# Patient Record
Sex: FEMALE | Race: BLACK OR AFRICAN AMERICAN | NOT HISPANIC OR LATINO | Employment: UNEMPLOYED | URBAN - METROPOLITAN AREA
[De-identification: names, ages, dates, MRNs, and addresses within clinical notes are randomized per-mention and may not be internally consistent; named-entity substitution may affect disease eponyms.]

---

## 2019-12-27 PROBLEM — Z59.6 POVERTY: Status: ACTIVE | Noted: 2019-12-27

## 2019-12-27 PROBLEM — I50.9 CHRONIC HEART FAILURE: Status: ACTIVE | Noted: 2019-12-27

## 2019-12-27 PROBLEM — R05.9 COUGH: Status: ACTIVE | Noted: 2019-12-27

## 2019-12-27 PROBLEM — J45.909 ASTHMA: Status: ACTIVE | Noted: 2019-12-27

## 2020-03-10 PROBLEM — G89.29 CHRONIC PAIN OF RIGHT KNEE: Status: ACTIVE | Noted: 2020-03-10

## 2020-03-10 PROBLEM — I10 HYPERTENSION: Status: ACTIVE | Noted: 2020-03-10

## 2020-03-10 PROBLEM — R05.9 COUGH: Status: RESOLVED | Noted: 2019-12-27 | Resolved: 2020-03-10

## 2020-03-10 PROBLEM — M25.561 CHRONIC PAIN OF RIGHT KNEE: Status: ACTIVE | Noted: 2020-03-10

## 2020-04-04 ENCOUNTER — NURSE TRIAGE (OUTPATIENT)
Dept: ADMINISTRATIVE | Facility: CLINIC | Age: 60
End: 2020-04-04

## 2020-04-04 NOTE — TELEPHONE ENCOUNTER
Reason for Disposition   [1] Caller requesting NON-URGENT health information AND [2] PCP's office is the best resource    Protocols used: INFORMATION ONLY CALL-A-AH    Pt stated she had an appointment for COVID 19 testing today, but overslept and missed her appointment. Spoke with Cristian MONTILLA in scheduling and he stated they cannot schedule or reschedule for COVID 19 testing. Pt advised a message will be sent to the Provider's office that ordered the test, but the Provider might not see it until Monday. Advised to go to ED for sob/difficulty breathing. Pt verbalized understanding.

## 2020-05-28 PROBLEM — E78.5 DYSLIPIDEMIA: Status: ACTIVE | Noted: 2020-05-28

## 2020-06-16 PROBLEM — Z95.810 HISTORY OF IMPLANTABLE CARDIOVERTER-DEFIBRILLATOR (ICD) PLACEMENT: Status: ACTIVE | Noted: 2020-06-16

## 2020-06-16 PROBLEM — I50.22 CHRONIC SYSTOLIC CONGESTIVE HEART FAILURE: Status: ACTIVE | Noted: 2020-06-16

## 2020-06-29 ENCOUNTER — NURSE TRIAGE (OUTPATIENT)
Dept: ADMINISTRATIVE | Facility: CLINIC | Age: 60
End: 2020-06-29

## 2020-09-16 PROBLEM — I50.22 CHRONIC SYSTOLIC HEART FAILURE: Status: ACTIVE | Noted: 2019-12-27

## 2020-10-26 PROBLEM — J45.20 MILD INTERMITTENT ASTHMA WITHOUT COMPLICATION: Status: ACTIVE | Noted: 2019-12-27

## 2020-10-26 PROBLEM — K08.89 PAIN, DENTAL: Status: ACTIVE | Noted: 2020-10-26

## 2022-01-04 DIAGNOSIS — U07.1 COVID-19 VIRUS DETECTED: ICD-10-CM

## 2023-02-27 PROBLEM — N18.32 STAGE 3B CHRONIC KIDNEY DISEASE: Status: ACTIVE | Noted: 2023-02-27

## 2024-07-27 PROBLEM — Z47.1 AFTERCARE FOLLOWING JOINT REPLACEMENT SURGERY: Status: ACTIVE | Noted: 2023-02-28

## 2024-07-27 PROBLEM — Z96.651 PRESENCE OF RIGHT ARTIFICIAL KNEE JOINT: Status: ACTIVE | Noted: 2023-02-28

## 2024-07-27 PROBLEM — Z87.891 PERSONAL HISTORY OF NICOTINE DEPENDENCE: Status: ACTIVE | Noted: 2023-01-01

## 2024-07-27 PROBLEM — Z91.81 HISTORY OF FALLING: Status: ACTIVE | Noted: 2023-01-01

## 2024-07-27 PROBLEM — I13.0 HYPERTENSIVE HEART AND CHRONIC KIDNEY DISEASE WITH HEART FAILURE AND STAGE 1 THROUGH STAGE 4 CHRONIC KIDNEY DISEASE, OR UNSPECIFIED CHRONIC KIDNEY DISEASE: Status: ACTIVE | Noted: 2023-02-28

## 2024-10-10 ENCOUNTER — PATIENT OUTREACH (OUTPATIENT)
Dept: ADMINISTRATIVE | Facility: HOSPITAL | Age: 64
End: 2024-10-10
Payer: MEDICARE